# Patient Record
Sex: FEMALE | Race: WHITE | Employment: OTHER | URBAN - METROPOLITAN AREA
[De-identification: names, ages, dates, MRNs, and addresses within clinical notes are randomized per-mention and may not be internally consistent; named-entity substitution may affect disease eponyms.]

---

## 2017-01-08 ENCOUNTER — OFFICE VISIT (OUTPATIENT)
Dept: FAMILY MEDICINE CLINIC | Facility: CLINIC | Age: 77
End: 2017-01-08

## 2017-01-08 VITALS
OXYGEN SATURATION: 90 % | RESPIRATION RATE: 18 BRPM | SYSTOLIC BLOOD PRESSURE: 170 MMHG | HEART RATE: 92 BPM | DIASTOLIC BLOOD PRESSURE: 90 MMHG

## 2017-01-08 DIAGNOSIS — Z02.9 ENCOUNTERS FOR ADMINISTRATIVE PURPOSES: Primary | ICD-10-CM

## 2017-01-08 DIAGNOSIS — R06.02 SHORTNESS OF BREATH: ICD-10-CM

## 2017-01-08 NOTE — PROGRESS NOTES
Indivual in mild distress presents with CC of URI and runny nose. Individual also reports SOB, urinary frequency, and that her blood glucose is poorly controlled.     SPO% low- see vitals, observed tremor which patient reports is new, observed use of access

## 2017-01-25 PROBLEM — J44.9 CHRONIC OBSTRUCTIVE PULMONARY DISEASE, UNSPECIFIED COPD TYPE (HCC): Status: ACTIVE | Noted: 2017-01-25

## 2017-04-27 PROCEDURE — 80053 COMPREHEN METABOLIC PANEL: CPT | Performed by: INTERNAL MEDICINE

## 2017-04-27 PROCEDURE — 83036 HEMOGLOBIN GLYCOSYLATED A1C: CPT | Performed by: INTERNAL MEDICINE

## 2017-04-27 PROCEDURE — 36415 COLL VENOUS BLD VENIPUNCTURE: CPT | Performed by: INTERNAL MEDICINE

## 2017-07-08 ENCOUNTER — OFFICE VISIT (OUTPATIENT)
Dept: FAMILY MEDICINE CLINIC | Facility: CLINIC | Age: 77
End: 2017-07-08

## 2017-07-08 VITALS
HEART RATE: 78 BPM | DIASTOLIC BLOOD PRESSURE: 78 MMHG | SYSTOLIC BLOOD PRESSURE: 136 MMHG | WEIGHT: 110 LBS | TEMPERATURE: 98 F | HEIGHT: 62 IN | BODY MASS INDEX: 20.24 KG/M2

## 2017-07-08 DIAGNOSIS — L30.1 DYSHIDROTIC HAND DERMATITIS: Primary | ICD-10-CM

## 2017-07-08 PROCEDURE — 99213 OFFICE O/P EST LOW 20 MIN: CPT | Performed by: NURSE PRACTITIONER

## 2017-07-08 RX ORDER — ATENOLOL 25 MG/1
25 TABLET ORAL
COMMUNITY
End: 2017-07-10

## 2017-07-08 NOTE — PATIENT INSTRUCTIONS
Self-Care for Skin Rashes     Pat your skin dry. Do not rub. When your skin reacts to a substance your body is sensitive to, it can cause a rash. You can treat most rashes at home by keeping the skin clean and dry.  Many rashes may get better on their · Most over-the-counter antifungal medicines can treat athlete’s foot and many other fungal infections of the skin.   Check with your healthcare provider  Call your healthcare provider if:  · You were told that you have a fungal infection on your skin to ma Cellulitis is treated with antibiotics taken for 7 to 10 days. An open sore may be cleaned and covered with cool wet gauze. Symptoms should get better 1 to 2 days after treatment is started.  Make sure to take all the antibiotics for the full number of days

## 2017-07-08 NOTE — PROGRESS NOTES
[facility] CHIEF COMPLAINT:   Patient presents with:  Rash         HPI:   Rashawn Masters is a 76year old female who presents for evaluation of a rash. Per patient rash started in the past 7 days. Rash has been progressive since onset.   Patient has had similar Diagnosis Date   • Chronic obstructive pulmonary disease, unspecified COPD type ([facility] 1/25/2017   • Diverticulosis    • DMII (diabetes mellitus, type 2) ([facility] 6/16/2014   • HTN (hypertension) 6/16/2014   • Hypercholesterolemia 6/16/2014   • Osteoporosis HENT: Head atraumatic, normocephalic. TM's WNL bilaterally. Normal external nose. Nasal mucosa pink without edema. No erythema of the throat. Oropharynx moist without lesions. LUNGS: Clear to auscultation bilaterally. No wheezing, rhonchi, or rales.   No When your skin reacts to a substance your body is sensitive to, it can cause a rash. You can treat most rashes at home by keeping the skin clean and dry. Many rashes may get better on their own within 2 to 3 days.  You may need medical attention if your marycarmen · Most over-the-counter antifungal medicines can treat athlete’s foot and many other fungal infections of the skin.   Check with your healthcare provider  Call your healthcare provider if:  · You were told that you have a fungal infection on your skin to ma Cellulitis is treated with antibiotics taken for 7 to 10 days. An open sore may be cleaned and covered with cool wet gauze. Symptoms should get better 1 to 2 days after treatment is started.  Make sure to take all the antibiotics for the full number of days

## 2017-08-17 PROBLEM — K80.20 CHOLELITHIASIS: Status: ACTIVE | Noted: 2017-07-01

## 2018-06-14 ENCOUNTER — OFFICE VISIT (OUTPATIENT)
Dept: FAMILY MEDICINE CLINIC | Facility: CLINIC | Age: 78
End: 2018-06-14

## 2018-06-14 VITALS
OXYGEN SATURATION: 98 % | RESPIRATION RATE: 18 BRPM | TEMPERATURE: 98 F | HEART RATE: 92 BPM | DIASTOLIC BLOOD PRESSURE: 76 MMHG | SYSTOLIC BLOOD PRESSURE: 142 MMHG

## 2018-06-14 DIAGNOSIS — Z02.9 ADMINISTRATIVE ENCOUNTER: Primary | ICD-10-CM

## 2018-06-14 PROBLEM — J44.1 ACUTE EXACERBATION OF CHRONIC OBSTRUCTIVE PULMONARY DISEASE (COPD) (HCC): Status: ACTIVE | Noted: 2017-01-09

## 2018-06-14 PROBLEM — L02.214 ABSCESS OF RIGHT GROIN: Status: ACTIVE | Noted: 2017-07-24

## 2018-06-14 PROBLEM — R06.02 SHORTNESS OF BREATH: Status: ACTIVE | Noted: 2017-01-08

## 2018-06-14 RX ORDER — ATENOLOL 25 MG/1
25 TABLET ORAL
COMMUNITY
End: 2019-01-09 | Stop reason: ALTCHOICE

## 2018-06-14 RX ORDER — ASPIRIN 81 MG/1
81 TABLET ORAL
COMMUNITY

## 2018-06-14 NOTE — PROGRESS NOTES
Pt presented with intermittent LLQ abdominal pain, on-going for about 1 week, 3 days ago had two episodes of emesis and about 3-4 loose nonbloody stools, both have now resolved, currently able to tolerate PO intake. No abdominal pain now.  Currently now com

## 2018-07-06 PROBLEM — L02.214 ABSCESS OF RIGHT GROIN: Status: RESOLVED | Noted: 2017-07-24 | Resolved: 2018-07-06

## 2018-07-06 PROBLEM — M81.0 AGE-RELATED OSTEOPOROSIS WITHOUT CURRENT PATHOLOGICAL FRACTURE: Status: ACTIVE | Noted: 2018-07-06

## 2018-07-06 PROBLEM — J44.1 ACUTE EXACERBATION OF CHRONIC OBSTRUCTIVE PULMONARY DISEASE (COPD) (HCC): Status: RESOLVED | Noted: 2017-01-09 | Resolved: 2018-07-06

## 2019-09-26 ENCOUNTER — IMMUNIZATION (OUTPATIENT)
Dept: FAMILY MEDICINE CLINIC | Facility: CLINIC | Age: 79
End: 2019-09-26
Payer: MEDICARE

## 2019-09-26 DIAGNOSIS — Z23 NEED FOR VACCINATION: ICD-10-CM

## 2019-09-26 PROCEDURE — G0008 ADMIN INFLUENZA VIRUS VAC: HCPCS | Performed by: NURSE PRACTITIONER

## 2019-09-26 PROCEDURE — 90662 IIV NO PRSV INCREASED AG IM: CPT | Performed by: NURSE PRACTITIONER

## 2023-06-10 RX ORDER — MEMANTINE HYDROCHLORIDE 10 MG/1
10 TABLET ORAL
COMMUNITY

## 2023-06-10 RX ORDER — ATORVASTATIN CALCIUM 40 MG/1
40 TABLET, FILM COATED ORAL NIGHTLY
COMMUNITY

## 2023-06-10 RX ORDER — PHENOL 1.4 %
600 AEROSOL, SPRAY (ML) MUCOUS MEMBRANE DAILY
COMMUNITY

## 2023-06-12 ENCOUNTER — APPOINTMENT (OUTPATIENT)
Dept: GENERAL RADIOLOGY | Facility: HOSPITAL | Age: 83
End: 2023-06-12
Attending: UROLOGY
Payer: MEDICARE

## 2023-06-12 ENCOUNTER — HOSPITAL ENCOUNTER (OUTPATIENT)
Facility: HOSPITAL | Age: 83
Setting detail: HOSPITAL OUTPATIENT SURGERY
Discharge: HOME | End: 2023-06-12
Attending: UROLOGY | Admitting: UROLOGY
Payer: MEDICARE

## 2023-06-12 ENCOUNTER — ANESTHESIA (OUTPATIENT)
Dept: SURGERY | Facility: HOSPITAL | Age: 83
End: 2023-06-12
Payer: MEDICARE

## 2023-06-12 ENCOUNTER — ANESTHESIA EVENT (OUTPATIENT)
Dept: SURGERY | Facility: HOSPITAL | Age: 83
End: 2023-06-12
Payer: MEDICARE

## 2023-06-12 VITALS
TEMPERATURE: 97 F | BODY MASS INDEX: 20.03 KG/M2 | RESPIRATION RATE: 14 BRPM | HEIGHT: 60 IN | HEART RATE: 75 BPM | WEIGHT: 102 LBS | DIASTOLIC BLOOD PRESSURE: 66 MMHG | SYSTOLIC BLOOD PRESSURE: 150 MMHG | OXYGEN SATURATION: 93 %

## 2023-06-12 LAB
GLUCOSE BLDC GLUCOMTR-MCNC: 146 MG/DL (ref 70–99)
GLUCOSE BLDC GLUCOMTR-MCNC: 154 MG/DL (ref 70–99)

## 2023-06-12 PROCEDURE — 0T768DZ DILATION OF RIGHT URETER WITH INTRALUMINAL DEVICE, VIA NATURAL OR ARTIFICIAL OPENING ENDOSCOPIC: ICD-10-PCS | Performed by: UROLOGY

## 2023-06-12 PROCEDURE — BT1D1ZZ FLUOROSCOPY OF RIGHT KIDNEY, URETER AND BLADDER USING LOW OSMOLAR CONTRAST: ICD-10-PCS | Performed by: UROLOGY

## 2023-06-12 PROCEDURE — 82365 CALCULUS SPECTROSCOPY: CPT | Performed by: UROLOGY

## 2023-06-12 PROCEDURE — 0TC68ZZ EXTIRPATION OF MATTER FROM RIGHT URETER, VIA NATURAL OR ARTIFICIAL OPENING ENDOSCOPIC: ICD-10-PCS | Performed by: UROLOGY

## 2023-06-12 PROCEDURE — 82962 GLUCOSE BLOOD TEST: CPT

## 2023-06-12 PROCEDURE — 88300 SURGICAL PATH GROSS: CPT | Performed by: UROLOGY

## 2023-06-12 DEVICE — URETERAL STENT
Type: IMPLANTABLE DEVICE | Status: FUNCTIONAL
Brand: ASCERTA™

## 2023-06-12 RX ORDER — LIDOCAINE HYDROCHLORIDE 20 MG/ML
JELLY TOPICAL AS NEEDED
Status: DISCONTINUED | OUTPATIENT
Start: 2023-06-12 | End: 2023-06-12 | Stop reason: HOSPADM

## 2023-06-12 RX ORDER — METOCLOPRAMIDE HYDROCHLORIDE 5 MG/ML
10 INJECTION INTRAMUSCULAR; INTRAVENOUS EVERY 8 HOURS PRN
Status: DISCONTINUED | OUTPATIENT
Start: 2023-06-12 | End: 2023-06-12

## 2023-06-12 RX ORDER — MORPHINE SULFATE 10 MG/ML
6 INJECTION, SOLUTION INTRAMUSCULAR; INTRAVENOUS EVERY 10 MIN PRN
Status: DISCONTINUED | OUTPATIENT
Start: 2023-06-12 | End: 2023-06-12

## 2023-06-12 RX ORDER — SODIUM CHLORIDE, SODIUM LACTATE, POTASSIUM CHLORIDE, CALCIUM CHLORIDE 600; 310; 30; 20 MG/100ML; MG/100ML; MG/100ML; MG/100ML
INJECTION, SOLUTION INTRAVENOUS CONTINUOUS
Status: DISCONTINUED | OUTPATIENT
Start: 2023-06-12 | End: 2023-06-12

## 2023-06-12 RX ORDER — NALOXONE HYDROCHLORIDE 0.4 MG/ML
80 INJECTION, SOLUTION INTRAMUSCULAR; INTRAVENOUS; SUBCUTANEOUS AS NEEDED
Status: DISCONTINUED | OUTPATIENT
Start: 2023-06-12 | End: 2023-06-12

## 2023-06-12 RX ORDER — CEFOXITIN 1 G/1
INJECTION, POWDER, FOR SOLUTION INTRAVENOUS AS NEEDED
Status: DISCONTINUED | OUTPATIENT
Start: 2023-06-12 | End: 2023-06-12 | Stop reason: SURG

## 2023-06-12 RX ORDER — MORPHINE SULFATE 4 MG/ML
4 INJECTION, SOLUTION INTRAMUSCULAR; INTRAVENOUS EVERY 10 MIN PRN
Status: DISCONTINUED | OUTPATIENT
Start: 2023-06-12 | End: 2023-06-12

## 2023-06-12 RX ORDER — ACETAMINOPHEN 500 MG
1000 TABLET ORAL ONCE
Status: COMPLETED | OUTPATIENT
Start: 2023-06-12 | End: 2023-06-12

## 2023-06-12 RX ORDER — ONDANSETRON 2 MG/ML
4 INJECTION INTRAMUSCULAR; INTRAVENOUS EVERY 6 HOURS PRN
Status: DISCONTINUED | OUTPATIENT
Start: 2023-06-12 | End: 2023-06-12

## 2023-06-12 RX ORDER — MORPHINE SULFATE 4 MG/ML
2 INJECTION, SOLUTION INTRAMUSCULAR; INTRAVENOUS EVERY 10 MIN PRN
Status: DISCONTINUED | OUTPATIENT
Start: 2023-06-12 | End: 2023-06-12

## 2023-06-12 RX ORDER — HYDROMORPHONE HYDROCHLORIDE 1 MG/ML
0.6 INJECTION, SOLUTION INTRAMUSCULAR; INTRAVENOUS; SUBCUTANEOUS EVERY 5 MIN PRN
Status: DISCONTINUED | OUTPATIENT
Start: 2023-06-12 | End: 2023-06-12

## 2023-06-12 RX ORDER — ROCURONIUM BROMIDE 10 MG/ML
INJECTION, SOLUTION INTRAVENOUS AS NEEDED
Status: DISCONTINUED | OUTPATIENT
Start: 2023-06-12 | End: 2023-06-12 | Stop reason: SURG

## 2023-06-12 RX ORDER — HYDROMORPHONE HYDROCHLORIDE 1 MG/ML
0.2 INJECTION, SOLUTION INTRAMUSCULAR; INTRAVENOUS; SUBCUTANEOUS EVERY 5 MIN PRN
Status: DISCONTINUED | OUTPATIENT
Start: 2023-06-12 | End: 2023-06-12

## 2023-06-12 RX ORDER — DEXTROSE MONOHYDRATE 25 G/50ML
50 INJECTION, SOLUTION INTRAVENOUS
Status: DISCONTINUED | OUTPATIENT
Start: 2023-06-12 | End: 2023-06-12 | Stop reason: HOSPADM

## 2023-06-12 RX ORDER — NICOTINE POLACRILEX 4 MG
30 LOZENGE BUCCAL
Status: DISCONTINUED | OUTPATIENT
Start: 2023-06-12 | End: 2023-06-12

## 2023-06-12 RX ORDER — DEXAMETHASONE SODIUM PHOSPHATE 4 MG/ML
VIAL (ML) INJECTION AS NEEDED
Status: DISCONTINUED | OUTPATIENT
Start: 2023-06-12 | End: 2023-06-12 | Stop reason: SURG

## 2023-06-12 RX ORDER — NICOTINE POLACRILEX 4 MG
30 LOZENGE BUCCAL
Status: DISCONTINUED | OUTPATIENT
Start: 2023-06-12 | End: 2023-06-12 | Stop reason: HOSPADM

## 2023-06-12 RX ORDER — DEXTROSE MONOHYDRATE 25 G/50ML
50 INJECTION, SOLUTION INTRAVENOUS
Status: DISCONTINUED | OUTPATIENT
Start: 2023-06-12 | End: 2023-06-12

## 2023-06-12 RX ORDER — NICOTINE POLACRILEX 4 MG
15 LOZENGE BUCCAL
Status: DISCONTINUED | OUTPATIENT
Start: 2023-06-12 | End: 2023-06-12

## 2023-06-12 RX ORDER — ONDANSETRON 2 MG/ML
INJECTION INTRAMUSCULAR; INTRAVENOUS AS NEEDED
Status: DISCONTINUED | OUTPATIENT
Start: 2023-06-12 | End: 2023-06-12 | Stop reason: SURG

## 2023-06-12 RX ORDER — METOPROLOL TARTRATE 5 MG/5ML
2.5 INJECTION INTRAVENOUS ONCE
Status: DISCONTINUED | OUTPATIENT
Start: 2023-06-12 | End: 2023-06-12

## 2023-06-12 RX ORDER — NICOTINE POLACRILEX 4 MG
15 LOZENGE BUCCAL
Status: DISCONTINUED | OUTPATIENT
Start: 2023-06-12 | End: 2023-06-12 | Stop reason: HOSPADM

## 2023-06-12 RX ORDER — HYDROMORPHONE HYDROCHLORIDE 1 MG/ML
0.4 INJECTION, SOLUTION INTRAMUSCULAR; INTRAVENOUS; SUBCUTANEOUS EVERY 5 MIN PRN
Status: DISCONTINUED | OUTPATIENT
Start: 2023-06-12 | End: 2023-06-12

## 2023-06-12 RX ADMIN — CEFOXITIN 1 G: 1 INJECTION, POWDER, FOR SOLUTION INTRAVENOUS at 09:22:00

## 2023-06-12 RX ADMIN — ROCURONIUM BROMIDE 50 MG: 10 INJECTION, SOLUTION INTRAVENOUS at 09:29:00

## 2023-06-12 RX ADMIN — DEXAMETHASONE SODIUM PHOSPHATE 4 MG: 4 MG/ML VIAL (ML) INJECTION at 09:48:00

## 2023-06-12 RX ADMIN — ONDANSETRON 4 MG: 2 INJECTION INTRAMUSCULAR; INTRAVENOUS at 09:48:00

## 2023-06-12 NOTE — ANESTHESIA POSTPROCEDURE EVALUATION
Patient: Manisha Chong    Procedure Summary     Date: 06/12/23 Room / Location: [address] [place] [address] MAIN OR 14 / [address] [place] [address] MAIN OR    Anesthesia Start: 6986 Anesthesia Stop:     Procedures:       Cystoscopy, right retrograde pyelogram, right ureteroscopy, laser lithotripsy, stone extraction, right ureteral stent placement (Right)      CYSTOSCOPY URETEROSCOPY (Right)      LASER HOLMIUM LITHOTRIPSY (Right)      CYSTOSCOPY STENT INSERTION (Right) Diagnosis: (Right renal pelvic stone)    Surgeons: Valencia Mcqueen MD Anesthesiologist: Jessi Lopez MD    Anesthesia Type: general ASA Status: 3          Anesthesia Type: general    Vitals Value Taken Time   /56 06/12/23 1047   Temp 98 06/12/23 1051   Pulse 62 06/12/23 1051   Resp 12 06/12/23 1051   SpO2 100 % 06/12/23 1051   Vitals shown include unvalidated device data.     [address] [place] [address] AN Post Evaluation:   Patient Evaluated in PACU  Patient Participation: complete - patient participated  Level of Consciousness: awake  Pain Management: adequate  Airway Patency:patent  Dental exam unchanged from preop  Yes    Cardiovascular Status: acceptable  Respiratory Status: acceptable  Postoperative Hydration acceptable      Lizbeth Ferreira MD  6/12/2023 10:51 AM

## 2023-06-12 NOTE — ANESTHESIA PROCEDURE NOTES
Airway  Date/Time: 6/12/2023 9:29 AM  Urgency: Elective    Airway not difficult    General Information and Staff    Patient location during procedure: OR  Anesthesiologist: Cale Gregg MD  Performed: anesthesiologist   Performed by: Cale Gregg MD  Authorized by: Cale Gregg MD      Indications and Patient Condition  Indications for airway management: anesthesia  Sedation level: deep  Preoxygenated: yes  Patient position: sniffing  Mask difficulty assessment: 1 - vent by mask    Final Airway Details  Final airway type: endotracheal airway      Successful airway: ETT  Cuffed: yes   Successful intubation technique: direct laryngoscopy  Endotracheal tube insertion site: oral    Placement verified by: capnometry   Measured from: teeth  Number of attempts at approach: 1

## 2023-06-12 NOTE — OPERATIVE REPORT
[facility]     PATIENT'S NAME: Ascencion Mckeon   ATTENDING PHYSICIAN: Romario Goldstein MD   OPERATING PHYSICIAN: Romario Goldstein MD     MRN: [id number]  : 1940  DATE OF OPERATION: 2023    OPERATIVE REPORT        PREOPERATIVE DIAGNOSIS:  Right renal stone    POSTOPERATIVE DIAGNOSIS:  Same    PROCEDURE PERFORMED:  Cystoscopy, right retrograde pyelogram, right ureteroscopy with laser lithotripsy and stone extraction, right ureteral stent insertion     ANESTHESIA:  General     ESTIMATED BLOOD LOSS:  Minimal     INTRAVENOUS FLUIDS:  See anesthesia record. URINE OUTPUT:  Not measured     COMPLICATIONS:  None     DRAINS:  6 Fr x 22 cm ureteral stent     SPECIMENS:  Right renal stone for chemical analysis     DISPOSITION:  Stable to recovery room. INDICATIONS:  The patient is a 78year old-year-old female who was found to have a large right renal pelvis stone. We discussed options for management including observation versus staged ESWL versus ureteroscopic stone extraction. Patient elected to proceed with ureteroscopic stone extraction. We discussed the risks including bleeding, infection, damage to surrounding structures, ureteral injury, ureteral stricture formation, inability to remove all stones, need for ureteral stent and stent related complications, need for additional procedures, and others. Details were discussed with patient and her family. All questions were answered. FINDINGS:   Normal cystourethroscopy. Filling defect within the right renal pelvis consistent with large stone. Quite dilated right ureter with mild tortuosity. No apparent obstruction. Large right renal pelvis stone. No other renal stones seen. PROCEDURE:  Informed consent was obtained. The patient was brought to the operative suite where general anesthesia was administered. IV antibiotics were administered and SCDs were applied.   Patient was placed in the dorsolithotomy position, prepped, and draped in usual fashion. After timeout was completed, the rigid cystoscope was inserted per urethra and advanced into the bladder under direct vision. The bladder was thoroughly inspected and no abnormalities were seen. The right ureteral orifice was cannulated with a 5 [employer] Kayla open-ended ureteral catheter which was inserted over the wire into the distal ureter. Retrograde pyelogram was performed which revealed a dilated ureter with some tortuosity. A sensor wire was advanced. It did take some maneuvering to get the wire to pass some J hooking of the mid ureter but ultimately the ureter straightened out. A dual-lumen ureteral catheter was inserted into the distal ureter over the wire. A second wire was placed as a safety. Next an 11-13 Canadian 28 cm ureteral access sheath was inserted over one of the wires into the proximal ureter without resistance. Flexible ureteroscope was introduced through the access sheath. Nephroscopy was performed. A single large renal pelvis stone was seen. A 365 nm laser fiber was introduced to the scope and used to fragment the stone completely. The stone did fragment very readily. Several stone fragments were removed using the 1.9 Canadian 0 tip basket. The remaining stone fragments were completely dusted. I performed very careful nephroscopy at the conclusion of the procedure to confirm that all clinically significant stone fragments were removed. There was only very fine stone dust remaining. Ureteroscope was then slowly backed down the ureter removing the access sheath at the same time. The entire length of the ureter was inspected and no injuries or ureteral stones were seen. A 6 Canadian by 22 cm ureteral stent was then placed over the wire under fluoroscopic guidance. Upon removal of the wire, the proximal and distal curls were seen in appropriate position. The bladder was emptied and the scope was removed.   Viscous lidocaine was instilled per urethra for local analgesia. The patient was returned to the supine position awakened. She was taken the recovery room in stable condition having tolerated the procedure well.

## 2023-06-23 LAB
CAOX MONOHYDRATE: 40 %
CARBONATE APATITE: 40 %
MG NH4 PO4 (STRUVITE): 20 %
WEIGHT-STONE: 20 MG

## (undated) DEVICE — CYSTO PACK: Brand: MEDLINE INDUSTRIES, INC.

## (undated) DEVICE — HYBRID SENSOR WIRE .035 STR

## (undated) DEVICE — ENDOSCOPIC VALVE WITH ADAPTER.: Brand: SURSEAL® II

## (undated) DEVICE — SOLO FLEX HYBRID GUIDEWIRE .03

## (undated) DEVICE — PAD,EYE,LARGE,2 1/8"X2 5/8",STERILE,LF: Brand: MEDLINE

## (undated) DEVICE — VIAL ISOVUE 300 10X100ML

## (undated) DEVICE — URETERAL ACCESS SHEATH SET: Brand: NAVIGATOR HD

## (undated) DEVICE — TIGERTAIL 5F FLXTIP 70CM

## (undated) DEVICE — SOL NACL IRRIG 0.9% 1000ML BTL

## (undated) DEVICE — ZIPWIRE GUIDEWIRE 035X150 STR

## (undated) DEVICE — SNAP KOVER: Brand: UNBRANDED

## (undated) DEVICE — NITINOL STONE RETRIEVAL BASKET: Brand: ZERO TIP

## (undated) DEVICE — GAMMEX® PI HYBRID SIZE 6, STERILE POWDER-FREE SURGICAL GLOVE, POLYISOPRENE AND NEOPRENE BLEND: Brand: GAMMEX

## (undated) DEVICE — Device

## (undated) DEVICE — UROLOGY DRAIN BAG

## (undated) DEVICE — MEDI-VAC NON-CONDUCTIVE SUCTION TUBING: Brand: CARDINAL HEALTH

## (undated) DEVICE — SLEEVE KENDALL SCD EXPRESS MED

## (undated) DEVICE — DUAL LUMEN URETERAL CATHETER

## (undated) DEVICE — STERILE WATER 1000ML BTL

## (undated) DEVICE — SOLUTION  .9 3000ML

## (undated) NOTE — MR AVS SNAPSHOT
[facility] [address]  [phone]               Thank you for choosing us for your health care visit with Gabino Levin NP.   We are glad to serve you and happy to provide you with this summary of TAKE ONE TABLET BY MOUTH TWO TIMES A DAY   Commonly known as:  GLUCOPHAGE           Verapamil HCl  MG Tbcr   TAKE ONE TABLET BY MOUTH EVERY MORNING   Commonly known as:  CALAN-SR           * Notice:   This list has 4 medication(s) that are the same as